# Patient Record
Sex: MALE | Race: WHITE | URBAN - METROPOLITAN AREA
[De-identification: names, ages, dates, MRNs, and addresses within clinical notes are randomized per-mention and may not be internally consistent; named-entity substitution may affect disease eponyms.]

---

## 2024-04-19 RX ORDER — AMLODIPINE AND OLMESARTAN MEDOXOMIL 5; 40 MG/1; MG/1
1 TABLET ORAL DAILY
Qty: 90 TABLET | Refills: 0 | OUTPATIENT
Start: 2024-04-19

## 2024-10-11 DIAGNOSIS — I10 HYPERTENSION, UNSPECIFIED TYPE: Primary | ICD-10-CM

## 2024-10-11 RX ORDER — AMLODIPINE AND OLMESARTAN MEDOXOMIL 5; 40 MG/1; MG/1
1 TABLET ORAL DAILY
Qty: 90 TABLET | Refills: 0 | Status: SHIPPED | OUTPATIENT
Start: 2024-10-11 | End: 2025-01-09

## 2024-10-11 RX ORDER — NEBIVOLOL 5 MG/1
5 TABLET ORAL DAILY
COMMUNITY
Start: 2023-11-11 | End: 2024-10-11 | Stop reason: SDUPTHER

## 2024-10-11 RX ORDER — AMLODIPINE AND OLMESARTAN MEDOXOMIL 5; 40 MG/1; MG/1
1 TABLET ORAL DAILY
COMMUNITY
Start: 2024-01-11 | End: 2024-10-11 | Stop reason: SDUPTHER

## 2024-10-11 RX ORDER — NEBIVOLOL 5 MG/1
5 TABLET ORAL DAILY
Qty: 90 TABLET | Refills: 0 | Status: SHIPPED | OUTPATIENT
Start: 2024-10-11 | End: 2025-01-09

## 2024-12-12 ENCOUNTER — APPOINTMENT (OUTPATIENT)
Dept: PRIMARY CARE | Facility: CLINIC | Age: 74
End: 2024-12-12

## 2024-12-12 VITALS
HEART RATE: 66 BPM | BODY MASS INDEX: 29.12 KG/M2 | WEIGHT: 215 LBS | HEIGHT: 72 IN | DIASTOLIC BLOOD PRESSURE: 90 MMHG | SYSTOLIC BLOOD PRESSURE: 140 MMHG | OXYGEN SATURATION: 94 %

## 2024-12-12 DIAGNOSIS — Z00.00 MEDICARE ANNUAL WELLNESS VISIT, SUBSEQUENT: Primary | ICD-10-CM

## 2024-12-12 DIAGNOSIS — R06.09 DYSPNEA ON EXERTION: ICD-10-CM

## 2024-12-12 DIAGNOSIS — I49.9 CARDIAC ARRHYTHMIA, UNSPECIFIED CARDIAC ARRHYTHMIA TYPE: ICD-10-CM

## 2024-12-12 PROBLEM — I10 HYPERTENSION: Status: ACTIVE | Noted: 2024-12-12

## 2024-12-12 PROCEDURE — G0439 PPPS, SUBSEQ VISIT: HCPCS

## 2024-12-12 PROCEDURE — 3080F DIAST BP >= 90 MM HG: CPT

## 2024-12-12 PROCEDURE — 1159F MED LIST DOCD IN RCRD: CPT

## 2024-12-12 PROCEDURE — 99214 OFFICE O/P EST MOD 30 MIN: CPT

## 2024-12-12 PROCEDURE — 3077F SYST BP >= 140 MM HG: CPT

## 2024-12-12 PROCEDURE — 1170F FXNL STATUS ASSESSED: CPT

## 2024-12-12 PROCEDURE — 1036F TOBACCO NON-USER: CPT

## 2024-12-12 PROCEDURE — 3008F BODY MASS INDEX DOCD: CPT

## 2024-12-12 RX ORDER — THIAMINE HCL 250 MG
1000 TABLET ORAL DAILY
COMMUNITY

## 2024-12-12 ASSESSMENT — ACTIVITIES OF DAILY LIVING (ADL)
BATHING: INDEPENDENT
DRESSING: INDEPENDENT

## 2024-12-12 ASSESSMENT — ENCOUNTER SYMPTOMS
OCCASIONAL FEELINGS OF UNSTEADINESS: 0
LOSS OF SENSATION IN FEET: 0
DEPRESSION: 0

## 2024-12-12 ASSESSMENT — PATIENT HEALTH QUESTIONNAIRE - PHQ9
2. FEELING DOWN, DEPRESSED OR HOPELESS: NOT AT ALL
1. LITTLE INTEREST OR PLEASURE IN DOING THINGS: NOT AT ALL
SUM OF ALL RESPONSES TO PHQ9 QUESTIONS 1 AND 2: 0

## 2024-12-12 NOTE — PROGRESS NOTES
I reviewed and examined the patient. I was present for the key exam elements, and I fully participated in the patient's care. I discussed the management of the care with the resident. I have personally reviewed the pertinent labs and imaging, as well as recent notes, with the patient. I have reviewed the note above and agree with the resident's medical decision making as documented in the resident's note, in addition to the following comments / findings:     Agree with the rest of the plan outlined below by resident physician. No red flags.      The patient understands and agrees to the assessment and plan of care. Patient has also agreed to follow up and comply with the treatment and evaluation as recommended today. Patient was instructed to call the office at [phone] should questions arise regarding their treatment or care.     Alfred Robin DO, FAOASM  Family Medicine   [address], [address]  [place] [address]     Alfred Robin DO

## 2024-12-12 NOTE — PROGRESS NOTES
Subjective   Patient ID: Yevgeniy Anders is a 74 y.o. male who presents for Annual Exam.    Past Medical, Surgical, and Family History reviewed and updated in chart.    Reviewed all medications by prescribing practitioner or clinical pharmacist (such as prescriptions, OTCs, herbal therapies and supplements) and documented in the medical record.    HPI  1.  Physical exam  Colonoscopy: 8/2022  Immunizations: declines flu shot, pneumococcal, zoster, tdap due (declines)     2.  Dyspnea on Exertion  He states that for a 4 month period of time this past summer, Yevgeniy was suffering from dyspnea on exertion, orthopnea, abdominal pain and bloating, indigestion  He states that he found a YouTube video with a dietitian talking about beriberi that for the symptoms he was experiencing and began taking high-dose thiamine supplementation at the videos recommendation  He states that about 2 months after beginning thiamine supplementation, his symptoms mostly resolved and that he believes it was the cure for his symptoms  He states that the orthopnea and GI issues have resolved but that he is still suffering from occasional dyspnea with exertion.  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 HTN  Patient is on amlodipine olmesartan and nebivolol  His blood pressure in the office is 140/90 and 135/90 on repeat  Other than the occasional dyspnea on exertion, he denies ACS symptoms    Review of Systems  All pertinent positive symptoms are included in the history of present illness.    All other systems have been reviewed and are negative and noncontributory to this patient's current ailments.    History reviewed. No pertinent past medical history.  History reviewed. No pertinent surgical  history.  Social History     Tobacco Use    Smoking status: Former     Types: Cigarettes    Smokeless tobacco: Never     No family history on file.  Immunization History   Administered Date(s) Administered    Moderna SARS-CoV-2 Vaccination 10/06/2021, 11/02/2021     Current Outpatient Medications   Medication Instructions    amlodipine-olmesartan (Jerri) 5-40 mg tablet 1 tablet, oral, Daily    nebivolol (BYSTOLIC) 5 mg, oral, Daily    thiamine (VITAMIN B-1) 1,000 mg, oral, Daily     No Known Allergies    Objective   Vitals:    12/12/24 0931   BP: 140/90   Pulse: 66   SpO2: 94%   Weight: 97.5 kg (215 lb)   Height: 1.829 m (6')     Body mass index is 29.16 kg/m².    BP Readings from Last 3 Encounters:   12/12/24 140/90   12/07/21 (!) 185/101      Wt Readings from Last 3 Encounters:   12/12/24 97.5 kg (215 lb)   12/07/21 90.7 kg (200 lb)        No visits with results within 1 Month(s) from this visit.   Latest known visit with results is:   Legacy Encounter on 01/24/2022   Component Date Value    WBC 01/24/2022 9.1     RBC 01/24/2022 5.21     Hemoglobin 01/24/2022 14.8     Hematocrit 01/24/2022 46.6     MCV 01/24/2022 89.4     MCH 01/24/2022 28.4     MCHC 01/24/2022 31.8     RDW-SD 01/24/2022 46.7     RDW-CV 01/24/2022 14.5     Platelets 01/24/2022 354     MPV 01/24/2022 10.2     nRBC 01/24/2022 0     Calcium 01/24/2022 9.6     AST 01/24/2022 21     Alkaline Phosphatase 01/24/2022 91     Total Bilirubin 01/24/2022 0.3     Total Protein 01/24/2022 7.8     Albumin 01/24/2022 4.3     Globulin, Total 01/24/2022 3.5     A/G Ratio 01/24/2022 1.2 (L)     Sodium 01/24/2022 139     Potassium 01/24/2022 4.6     Chloride 01/24/2022 102     Bicarbonate 01/24/2022 22 (L)     Anion Gap 01/24/2022 15     Urea Nitrogen 01/24/2022 15     Creatinine 01/24/2022 0.7     Urea Nitrogen/Creatinine* 01/24/2022 21.4 (H)     Glucose 01/24/2022 109 (H)     ALT (SGPT) 01/24/2022 19     ESTIMATED GFR 01/24/2022 100     SERUM COLOR 01/24/2022 YELLOW      SERUM CLARITY 01/24/2022 SLIGHTLY LIPEMIC     Fasting status 01/24/2022 UNKNOWN     Cholesterol 01/24/2022 174     Triglycerides 01/24/2022 110     HDL 01/24/2022 52     LDL Calculated 01/24/2022 100     Cholesterol/HDL Ratio 01/24/2022 3.3     PSA 01/24/2022 1.0     Thyroid Stimulating Horm* 01/24/2022 2.20      Physical Exam  CONSTITUTIONAL - well nourished, well developed, looks like stated age, in no acute distress, not ill-appearing, and not tired appearing  SKIN - vitiligo on hands bilaterally, normal skin turgor without rash, lesions, or nodules visualized  HEAD - no trauma, normocephalic  EYES - pupils are equal and reactive to light, extraocular muscles are intact, and normal external exam  NECK - supple without rigidity, no neck mass was observed, no thyromegaly or thyroid nodules  CHEST - clear to auscultation, no wheezing, no crackles and no rales, good effort  CARDIAC - irregular rhythm/skipped beats, regular rate, no murmur  ABDOMEN - no organomegaly, soft, nontender, nondistended, normal bowel sounds, no guarding/rebound/rigidity, negative McBurney sign and negative Clinton sign  EXTREMITIES - no obvious or evident edema, no obvious or evident deformities  NEUROLOGICAL - normal gait, normal balance, normal motor, no ataxia, alert, oriented and no focal signs  PSYCHIATRIC - alert, pleasant and cordial, age-appropriate  IMMUNOLOGIC - no cervical lymphadenopathy    Assessment/Plan   Problem List Items Addressed This Visit    None  Visit Diagnoses       Medicare annual wellness visit, subsequent    -  Primary    Relevant Orders    Prostate Specific Antigen    TSH with reflex to Free T4 if abnormal    Lipid Panel    Comprehensive Metabolic Panel    CBC and Auto Differential    Hemoglobin A1C    XR chest 2 views    Vitamin B1, Whole Blood    Dyspnea on exertion        Relevant Orders    TSH with reflex to Free T4 if abnormal    Comprehensive Metabolic Panel    CBC and Auto Differential    XR chest 2  views    Vitamin B1, Whole Blood    B-type natriuretic peptide    Cardiac arrhythmia, unspecified cardiac arrhythmia type              #Medicare Wellness  - colonoscopy UTD  - declines routine immunizations   - routine labs     #HTN  -Patient continues to be slightly hypertensive, however we will hold on medications at this time until he is evaluated by cardiology    #Dyspnea on Exertion  #Unspecified Arrhythmia   - Although mostly resolved, reported symptoms are highly suggestive of a cardiac etiology and likely congestive heart failure.  This is further supported by a new arrhythmia identified on exam.  EKG was attempted in the office however the machine could not capture a good reading.  -Patient is a former alcoholic however fulminant beriberi is very unlikely to be the cause of patient's symptoms, especially since his symptoms did not resolve until 2 months of supplementation with thiamine.    -He is also a former smoker who quit more than 30 years ago, so he does not qualify for low-dose CT cancer screening.  With that said, we will order chest x-ray to evaluate for any intrathoracic pathology that may be contributing to his dyspnea such as effusion or malignancy.  -He is amenable to cardiology referral for further evaluation    Follow-up in 1 year for subsequent Medicare visit or earlier as needed.    Franck Page, DO  PGY-2 Family Medicine

## 2025-03-29 ENCOUNTER — HOSPITAL ENCOUNTER (EMERGENCY)
Facility: HOSPITAL | Age: 75
Discharge: HOME | End: 2025-03-29
Payer: MEDICARE

## 2025-03-29 ENCOUNTER — APPOINTMENT (OUTPATIENT)
Dept: CARDIOLOGY | Facility: HOSPITAL | Age: 75
End: 2025-03-29
Payer: MEDICARE

## 2025-03-29 ENCOUNTER — APPOINTMENT (OUTPATIENT)
Dept: RADIOLOGY | Facility: HOSPITAL | Age: 75
End: 2025-03-29
Payer: MEDICARE

## 2025-03-29 VITALS
HEART RATE: 75 BPM | HEIGHT: 73 IN | BODY MASS INDEX: 28.72 KG/M2 | WEIGHT: 216.71 LBS | TEMPERATURE: 97.9 F | SYSTOLIC BLOOD PRESSURE: 125 MMHG | DIASTOLIC BLOOD PRESSURE: 79 MMHG | RESPIRATION RATE: 16 BRPM | OXYGEN SATURATION: 93 %

## 2025-03-29 DIAGNOSIS — R06.02 SHORTNESS OF BREATH: Primary | ICD-10-CM

## 2025-03-29 LAB
ALBUMIN SERPL BCP-MCNC: 4.5 G/DL (ref 3.4–5)
ALP SERPL-CCNC: 63 U/L (ref 33–136)
ALT SERPL W P-5'-P-CCNC: 40 U/L (ref 10–52)
ANION GAP SERPL CALCULATED.3IONS-SCNC: 11 MMOL/L (ref 10–20)
AST SERPL W P-5'-P-CCNC: 23 U/L (ref 9–39)
BASOPHILS # BLD AUTO: 0.06 X10*3/UL (ref 0–0.1)
BASOPHILS NFR BLD AUTO: 0.9 %
BILIRUB SERPL-MCNC: 0.6 MG/DL (ref 0–1.2)
BNP SERPL-MCNC: 75 PG/ML (ref 0–99)
BUN SERPL-MCNC: 20 MG/DL (ref 6–23)
CALCIUM SERPL-MCNC: 9.7 MG/DL (ref 8.6–10.3)
CARDIAC TROPONIN I PNL SERPL HS: 4 NG/L (ref 0–20)
CARDIAC TROPONIN I PNL SERPL HS: 4 NG/L (ref 0–20)
CHLORIDE SERPL-SCNC: 105 MMOL/L (ref 98–107)
CO2 SERPL-SCNC: 26 MMOL/L (ref 21–32)
CREAT SERPL-MCNC: 0.97 MG/DL (ref 0.5–1.3)
EGFRCR SERPLBLD CKD-EPI 2021: 83 ML/MIN/1.73M*2
EOSINOPHIL # BLD AUTO: 0.19 X10*3/UL (ref 0–0.4)
EOSINOPHIL NFR BLD AUTO: 2.9 %
ERYTHROCYTE [DISTWIDTH] IN BLOOD BY AUTOMATED COUNT: 13.2 % (ref 11.5–14.5)
GLUCOSE SERPL-MCNC: 115 MG/DL (ref 74–99)
HCT VFR BLD AUTO: 48.6 % (ref 41–52)
HGB BLD-MCNC: 15.9 G/DL (ref 13.5–17.5)
IMM GRANULOCYTES # BLD AUTO: 0.02 X10*3/UL (ref 0–0.5)
IMM GRANULOCYTES NFR BLD AUTO: 0.3 % (ref 0–0.9)
LYMPHOCYTES # BLD AUTO: 1.53 X10*3/UL (ref 0.8–3)
LYMPHOCYTES NFR BLD AUTO: 23.5 %
MCH RBC QN AUTO: 29.8 PG (ref 26–34)
MCHC RBC AUTO-ENTMCNC: 32.7 G/DL (ref 32–36)
MCV RBC AUTO: 91 FL (ref 80–100)
MONOCYTES # BLD AUTO: 0.43 X10*3/UL (ref 0.05–0.8)
MONOCYTES NFR BLD AUTO: 6.6 %
NEUTROPHILS # BLD AUTO: 4.28 X10*3/UL (ref 1.6–5.5)
NEUTROPHILS NFR BLD AUTO: 65.8 %
NRBC BLD-RTO: 0 /100 WBCS (ref 0–0)
PLATELET # BLD AUTO: 240 X10*3/UL (ref 150–450)
POTASSIUM SERPL-SCNC: 4.3 MMOL/L (ref 3.5–5.3)
PROT SERPL-MCNC: 7.4 G/DL (ref 6.4–8.2)
RBC # BLD AUTO: 5.33 X10*6/UL (ref 4.5–5.9)
SODIUM SERPL-SCNC: 138 MMOL/L (ref 136–145)
WBC # BLD AUTO: 6.5 X10*3/UL (ref 4.4–11.3)

## 2025-03-29 PROCEDURE — 71045 X-RAY EXAM CHEST 1 VIEW: CPT

## 2025-03-29 PROCEDURE — 93005 ELECTROCARDIOGRAM TRACING: CPT

## 2025-03-29 PROCEDURE — 2550000001 HC RX 255 CONTRASTS

## 2025-03-29 PROCEDURE — 2500000004 HC RX 250 GENERAL PHARMACY W/ HCPCS (ALT 636 FOR OP/ED): Mod: JZ

## 2025-03-29 PROCEDURE — 80053 COMPREHEN METABOLIC PANEL: CPT | Performed by: EMERGENCY MEDICINE

## 2025-03-29 PROCEDURE — 85025 COMPLETE CBC W/AUTO DIFF WBC: CPT | Performed by: EMERGENCY MEDICINE

## 2025-03-29 PROCEDURE — 84484 ASSAY OF TROPONIN QUANT: CPT | Performed by: EMERGENCY MEDICINE

## 2025-03-29 PROCEDURE — 71045 X-RAY EXAM CHEST 1 VIEW: CPT | Performed by: RADIOLOGY

## 2025-03-29 PROCEDURE — 83880 ASSAY OF NATRIURETIC PEPTIDE: CPT | Performed by: EMERGENCY MEDICINE

## 2025-03-29 PROCEDURE — 99285 EMERGENCY DEPT VISIT HI MDM: CPT | Mod: 25

## 2025-03-29 PROCEDURE — 71275 CT ANGIOGRAPHY CHEST: CPT | Performed by: RADIOLOGY

## 2025-03-29 PROCEDURE — 94640 AIRWAY INHALATION TREATMENT: CPT

## 2025-03-29 PROCEDURE — 71275 CT ANGIOGRAPHY CHEST: CPT

## 2025-03-29 PROCEDURE — 96374 THER/PROPH/DIAG INJ IV PUSH: CPT | Mod: 59

## 2025-03-29 PROCEDURE — 2500000002 HC RX 250 W HCPCS SELF ADMINISTERED DRUGS (ALT 637 FOR MEDICARE OP, ALT 636 FOR OP/ED): Mod: MUE

## 2025-03-29 PROCEDURE — 36415 COLL VENOUS BLD VENIPUNCTURE: CPT | Performed by: EMERGENCY MEDICINE

## 2025-03-29 RX ORDER — METHYLPREDNISOLONE 4 MG/1
TABLET ORAL
Qty: 21 TABLET | Refills: 0 | Status: SHIPPED | OUTPATIENT
Start: 2025-03-29 | End: 2025-04-04

## 2025-03-29 RX ORDER — IPRATROPIUM BROMIDE AND ALBUTEROL SULFATE 2.5; .5 MG/3ML; MG/3ML
3 SOLUTION RESPIRATORY (INHALATION)
Status: DISCONTINUED | OUTPATIENT
Start: 2025-03-29 | End: 2025-03-29 | Stop reason: HOSPADM

## 2025-03-29 RX ORDER — ALBUTEROL SULFATE 90 UG/1
2 INHALANT RESPIRATORY (INHALATION) EVERY 4 HOURS PRN
Qty: 18 G | Refills: 0 | Status: SHIPPED | OUTPATIENT
Start: 2025-03-29 | End: 2025-04-28

## 2025-03-29 RX ADMIN — IOHEXOL 75 ML: 350 INJECTION, SOLUTION INTRAVENOUS at 14:11

## 2025-03-29 RX ADMIN — IPRATROPIUM BROMIDE AND ALBUTEROL SULFATE 3 ML: 2.5; .5 SOLUTION RESPIRATORY (INHALATION) at 12:31

## 2025-03-29 RX ADMIN — IPRATROPIUM BROMIDE AND ALBUTEROL SULFATE 3 ML: 2.5; .5 SOLUTION RESPIRATORY (INHALATION) at 12:30

## 2025-03-29 RX ADMIN — METHYLPREDNISOLONE SODIUM SUCCINATE 125 MG: 125 INJECTION, POWDER, FOR SOLUTION INTRAMUSCULAR; INTRAVENOUS at 12:46

## 2025-03-29 ASSESSMENT — PAIN DESCRIPTION - ORIENTATION: ORIENTATION: LEFT;RIGHT;UPPER

## 2025-03-29 ASSESSMENT — PAIN DESCRIPTION - PAIN TYPE: TYPE: ACUTE PAIN

## 2025-03-29 ASSESSMENT — COLUMBIA-SUICIDE SEVERITY RATING SCALE - C-SSRS
2. HAVE YOU ACTUALLY HAD ANY THOUGHTS OF KILLING YOURSELF?: NO
6. HAVE YOU EVER DONE ANYTHING, STARTED TO DO ANYTHING, OR PREPARED TO DO ANYTHING TO END YOUR LIFE?: NO
1. IN THE PAST MONTH, HAVE YOU WISHED YOU WERE DEAD OR WISHED YOU COULD GO TO SLEEP AND NOT WAKE UP?: NO

## 2025-03-29 ASSESSMENT — PAIN DESCRIPTION - LOCATION: LOCATION: CHEST

## 2025-03-29 ASSESSMENT — PAIN DESCRIPTION - FREQUENCY: FREQUENCY: CONSTANT/CONTINUOUS

## 2025-03-29 ASSESSMENT — PAIN SCALES - GENERAL: PAINLEVEL_OUTOF10: 5 - MODERATE PAIN

## 2025-03-29 ASSESSMENT — PAIN - FUNCTIONAL ASSESSMENT: PAIN_FUNCTIONAL_ASSESSMENT: 0-10

## 2025-03-29 ASSESSMENT — PAIN DESCRIPTION - DESCRIPTORS: DESCRIPTORS: TIGHTNESS

## 2025-03-29 ASSESSMENT — PAIN DESCRIPTION - PROGRESSION: CLINICAL_PROGRESSION: NOT CHANGED

## 2025-03-29 ASSESSMENT — PAIN DESCRIPTION - ONSET: ONSET: SUDDEN

## 2025-03-31 LAB
ATRIAL RATE: 95 BPM
P AXIS: 69 DEGREES
P OFFSET: 199 MS
P ONSET: 139 MS
PR INTERVAL: 174 MS
Q ONSET: 226 MS
QRS COUNT: 15 BEATS
QRS DURATION: 94 MS
QT INTERVAL: 366 MS
QTC CALCULATION(BAZETT): 459 MS
QTC FREDERICIA: 426 MS
R AXIS: 83 DEGREES
T AXIS: 67 DEGREES
T OFFSET: 409 MS
VENTRICULAR RATE: 95 BPM

## 2025-04-02 NOTE — ED PROVIDER NOTES
HPI   Chief Complaint   Patient presents with    Shortness of Breath     I have been sob and it is hard for me to take a deep breath because I have tightness in my upper chest        HPI  Patient is a 74-year-old male who presents ED for shortness of breath x 2 years.  Patient reports progressively worsening exertional breath over the last couple of years, states when he walks relatively short distances he is very short of breath.  Denies any chest pain.  Endorses smoking for 30 years but quit 20 years ago.  Denies any formal diagnosis of COPD, does not wear oxygen.  Denies any history of DVT or PE, does not take blood thinners.  Denies any history of MI or CAD.  Denies any fever or chills.  Denies any cough.      Patient History   History reviewed. No pertinent past medical history.  History reviewed. No pertinent surgical history.  No family history on file.  Social History     Tobacco Use    Smoking status: Former     Types: Cigarettes    Smokeless tobacco: Never   Substance Use Topics    Alcohol use: Not on file    Drug use: Not on file       Physical Exam   ED Triage Vitals [03/29/25 1157]   Temperature Heart Rate Respirations BP   36.6 °C (97.9 °F) 68 18 (!) 157/99      Pulse Ox Temp Source Heart Rate Source Patient Position   97 % Oral Monitor Sitting      BP Location FiO2 (%)     Left arm --       Physical Exam  Vitals reviewed.   Constitutional:       General: He is not in acute distress.     Appearance: Normal appearance. He is not ill-appearing.   HENT:      Head: Normocephalic and atraumatic.   Eyes:      Extraocular Movements: Extraocular movements intact.   Cardiovascular:      Rate and Rhythm: Normal rate and regular rhythm.      Heart sounds: Normal heart sounds.   Pulmonary:      Effort: Pulmonary effort is normal.      Breath sounds: Normal breath sounds.   Abdominal:      General: Abdomen is flat. There is no distension.      Palpations: Abdomen is soft.      Tenderness: There is no abdominal  tenderness.   Musculoskeletal:         General: Normal range of motion.      Cervical back: Normal range of motion and neck supple.   Skin:     General: Skin is warm and dry.   Neurological:      General: No focal deficit present.      Mental Status: He is alert and oriented to person, place, and time.   Psychiatric:         Mood and Affect: Mood normal.         Behavior: Behavior normal.           ED Course & MDM   ED Course as of 04/02/25 1712   Sat Mar 29, 2025   1151 EKG on my independent interpretation: Normal sinus rhythm 95 bpm, normal axis, normal intervals, nonspecific ST/T wave inability although difficult to interpret in setting of significant artifact, no prior EKG for comparison [AAYUSH]   1337 Patient states he feels better after breathing treatments and steroids although his oxygen saturation is now 89% on room air.  Unclear why patient's oxygen saturation would drop below 90% after breathing treatment.  I made adjustments with the sensor to ensure normal waveform, patient is satting around 89 to 90% with normal waveform.  CT angio chest ordered to evaluate for possible pulmonary embolism. [VINCENT]      ED Course User Index  [VINCENT] London Durbin PA-C  [AAYUSH] Caitlin Roa MD         Diagnoses as of 04/02/25 1712   Shortness of breath                 No data recorded     [place] Coma Scale Score: 15 (03/29/25 1155 : Ilya Sheldon, EMT)                           Medical Decision Making  Parts of this chart have been completed using voice recognition software. Please excuse any errors of transcription.  My thought process and reason for plan has been formulated from the time that I saw the patient until the time of disposition and is not specific to one specific moment during their visit and furthermore my MDM encompasses this entire chart and not only this text box.    HPI:   A medically appropriate HPI was obtained, outlined above.    Yevgeniy Anders is a  74 y.o. male    Chief Complaint   Patient presents  with    Shortness of Breath     I have been sob and it is hard for me to take a deep breath because I have tightness in my upper chest        History reviewed. No pertinent past medical history.    History reviewed. No pertinent surgical history.    Social History     Tobacco Use    Smoking status: Former     Types: Cigarettes    Smokeless tobacco: Never       No family history on file.    No Known Allergies    Current Outpatient Medications   Medication Instructions    albuterol 90 mcg/actuation inhaler 2 puffs, inhalation, Every 4 hours PRN    amlodipine-olmesartan (Jerri) 5-40 mg tablet 1 tablet, oral, Daily    methylPREDNISolone (Medrol Dospak) 4 mg tablets Follow schedule on package instructions    nebivolol (BYSTOLIC) 5 mg, oral, Daily    thiamine (VITAMIN B-1) 1,000 mg, oral, Daily   for details    Exam:   No data found.    A medically appropriate exam performed, outlined above, given the known history and presentation.    EKG/Cardiac monitor:   If EKG was done and, it was interpreted by attending physician, see their note for ED course for more detail.    Medications given during visit:  Medications   methylPREDNISolone sod succinate (SOLU-Medrol) injection 125 mg (125 mg intravenous Given 3/29/25 1246)   iohexol (OMNIPaque) 350 mg iodine/mL solution 75 mL (75 mL intravenous Given 3/29/25 1411)        Diagnostic/tests:  Labs Reviewed   COMPREHENSIVE METABOLIC PANEL - Abnormal       Result Value    Glucose 115 (*)     Sodium 138      Potassium 4.3      Chloride 105      Bicarbonate 26      Anion Gap 11      Urea Nitrogen 20      Creatinine 0.97      eGFR 83      Calcium 9.7      Albumin 4.5      Alkaline Phosphatase 63      Total Protein 7.4      AST 23      Bilirubin, Total 0.6      ALT 40     B-TYPE NATRIURETIC PEPTIDE - Normal    BNP 75      Narrative:        <100 pg/mL - Heart failure unlikely  100-299 pg/mL - Intermediate probability of acute heart                  failure exacerbation. Correlate with  clinical                  context and patient history.    >=300 pg/mL - Heart Failure likely. Correlate with clinical                  context and patient history.    BNP testing is performed using different testing methodology at [facility] than at other [facility]. Direct result comparisons should only be made within the same method.      SERIAL TROPONIN-INITIAL - Normal    Troponin I, High Sensitivity 4      Narrative:     Less than 99th percentile of normal range cutoff-  Female and children under 18 years old <14 ng/L; Male <21 ng/L: Negative  Repeat testing should be performed if clinically indicated.     Female and children under 18 years old 14-50 ng/L; Male 21-50 ng/L:  Consistent with possible cardiac damage and possible increased clinical   risk. Serial measurements may help to assess extent of myocardial damage.     >50 ng/L: Consistent with cardiac damage, increased clinical risk and  myocardial infarction. Serial measurements may help assess extent of   myocardial damage.      NOTE: Children less than 1 year old may have higher baseline troponin   levels and results should be interpreted in conjunction with the overall   clinical context.     NOTE: Troponin I testing is performed using a different   testing methodology at [facility] than at other   [facility]. Direct result comparisons should only   be made within the same method.   SERIAL TROPONIN, 1 HOUR - Normal    Troponin I, High Sensitivity 4      Narrative:     Less than 99th percentile of normal range cutoff-  Female and children under 18 years old <14 ng/L; Male <21 ng/L: Negative  Repeat testing should be performed if clinically indicated.     Female and children under 18 years old 14-50 ng/L; Male 21-50 ng/L:  Consistent with possible cardiac damage and possible increased clinical   risk. Serial measurements may help to assess extent of myocardial damage.     >50 ng/L: Consistent with cardiac damage,  increased clinical risk and  myocardial infarction. Serial measurements may help assess extent of   myocardial damage.      NOTE: Children less than 1 year old may have higher baseline troponin   levels and results should be interpreted in conjunction with the overall   clinical context.     NOTE: Troponin I testing is performed using a different   testing methodology at [facility] than at other   [facility]. Direct result comparisons should only   be made within the same method.   CBC WITH AUTO DIFFERENTIAL    WBC 6.5      nRBC 0.0      RBC 5.33      Hemoglobin 15.9      Hematocrit 48.6      MCV 91      MCH 29.8      MCHC 32.7      RDW 13.2      Platelets 240      Neutrophils % 65.8      Immature Granulocytes %, Automated 0.3      Lymphocytes % 23.5      Monocytes % 6.6      Eosinophils % 2.9      Basophils % 0.9      Neutrophils Absolute 4.28      Immature Granulocytes Absolute, Automated 0.02      Lymphocytes Absolute 1.53      Monocytes Absolute 0.43      Eosinophils Absolute 0.19      Basophils Absolute 0.06     TROPONIN SERIES- (INITIAL, 1 HR)    Narrative:     The following orders were created for panel order Troponin I Series, High Sensitivity (0, 1 HR).  Procedure                               Abnormality         Status                     ---------                               -----------         ------                     Troponin I, High Sensiti...[[id number]]  Normal              Final result               Troponin, High Sensitivi...[[id number]]  Normal              Final result                 Please view results for these tests on the individual orders.        CT angio chest for pulmonary embolism   Final Result   1. No evidence of acute pulmonary embolus.   2. No focal infiltrate, pulmonary nodule or lymphadenopathy   identified.        MACRO:   None.             Signed by: Jarek Rodriguez 3/29/2025 2:52 PM   Dictation workstation:   [id number]      XR chest 1 view   Final Result    No acute cardiopulmonary abnormality.        Signed by: Ch[place]ga Cherrymyesha 3/29/2025 12:44 PM   Dictation workstation:   [id number]             [facility] Summary:  Unremarkable workup today.  Suspicion for ACS, CT negative for PE.  Return precautions were discussed.  Advised patient to follow-up with primary care provider in 1 to 2 weeks.    We have discussed the diagnosis and risks, and we agree with discharging home to follow-up with appropriate physician as directed. We also discussed returning to the Emergency Department immediately if new or worsening symptoms occur. We have discussed the symptoms which are most concerning that necessitate immediate return. Pt symptoms have been well controlled here and the patient is safe for discharge with appropriate outpatient follow up. The patient has verbalized understanding to return to ER without delay for new or worsening pains or for any other symptoms or concerns. I utilized the discharge clinical management tool provided [employer] to help estimate risk of negative outcome for this patient.      Disposition:  ED Prescriptions       Medication Sig Dispense Start Date End Date Auth. Provider    albuterol 90 mcg/actuation inhaler Inhale 2 puffs every 4 hours if needed for wheezing. 18 g 3/29/2025 4/28/2025 London Durbin PA-C    methylPREDNISolone (Medrol Dospak) 4 mg tablets Follow schedule on package instructions 21 tablet 3/29/2025 4/4/2025 London Durbin PA-C              Procedure  Procedures     London Durbin PA-C  04/02/25 5856

## 2025-04-21 DIAGNOSIS — I10 HYPERTENSION, UNSPECIFIED TYPE: ICD-10-CM

## 2025-04-21 NOTE — TELEPHONE ENCOUNTER
[facility] needs refill on   Amlodipine -olmesartan 5-40 mg   Nebivolol 5 mg   [facility] [place]

## 2025-04-23 ENCOUNTER — APPOINTMENT (OUTPATIENT)
Dept: PULMONOLOGY | Facility: CLINIC | Age: 75
End: 2025-04-23
Payer: MEDICARE

## 2025-04-23 VITALS
SYSTOLIC BLOOD PRESSURE: 175 MMHG | BODY MASS INDEX: 28.47 KG/M2 | OXYGEN SATURATION: 92 % | HEART RATE: 108 BPM | WEIGHT: 215.8 LBS | DIASTOLIC BLOOD PRESSURE: 110 MMHG

## 2025-04-23 DIAGNOSIS — R06.02 SHORTNESS OF BREATH: ICD-10-CM

## 2025-04-23 PROCEDURE — 3080F DIAST BP >= 90 MM HG: CPT | Performed by: PEDIATRICS

## 2025-04-23 PROCEDURE — 1160F RVW MEDS BY RX/DR IN RCRD: CPT | Performed by: PEDIATRICS

## 2025-04-23 PROCEDURE — 1159F MED LIST DOCD IN RCRD: CPT | Performed by: PEDIATRICS

## 2025-04-23 PROCEDURE — 1036F TOBACCO NON-USER: CPT | Performed by: PEDIATRICS

## 2025-04-23 PROCEDURE — 3077F SYST BP >= 140 MM HG: CPT | Performed by: PEDIATRICS

## 2025-04-23 PROCEDURE — 99205 OFFICE O/P NEW HI 60 MIN: CPT | Performed by: PEDIATRICS

## 2025-04-23 RX ORDER — ALBUTEROL SULFATE 0.83 MG/ML
3 SOLUTION RESPIRATORY (INHALATION) ONCE
OUTPATIENT
Start: 2025-04-23 | End: 2025-04-23

## 2025-04-23 RX ORDER — ALBUTEROL SULFATE 90 UG/1
1 INHALANT RESPIRATORY (INHALATION) ONCE
OUTPATIENT
Start: 2025-04-23

## 2025-04-23 RX ORDER — AMLODIPINE BESYLATE AND OLMESARTAN MEDOXOMIL 5; 40 MG/1; MG/1
1 TABLET ORAL DAILY
Qty: 30 TABLET | Refills: 0 | Status: SHIPPED | OUTPATIENT
Start: 2025-04-23 | End: 2025-05-23

## 2025-04-23 RX ORDER — NEBIVOLOL 5 MG/1
5 TABLET ORAL DAILY
Qty: 30 TABLET | Refills: 0 | Status: SHIPPED | OUTPATIENT
Start: 2025-04-23 | End: 2025-05-23

## 2025-04-23 RX ORDER — ALBUTEROL SULFATE 90 UG/1
2 INHALANT RESPIRATORY (INHALATION) EVERY 4 HOURS PRN
Qty: 18 G | Refills: 11 | Status: SHIPPED | OUTPATIENT
Start: 2025-04-23 | End: 2025-05-23

## 2025-04-23 ASSESSMENT — PATIENT HEALTH QUESTIONNAIRE - PHQ9
SUM OF ALL RESPONSES TO PHQ9 QUESTIONS 1 AND 2: 0
1. LITTLE INTEREST OR PLEASURE IN DOING THINGS: NOT AT ALL
2. FEELING DOWN, DEPRESSED OR HOPELESS: NOT AT ALL

## 2025-04-23 NOTE — LETTER
"April 23, 2025     London Durbin PA-C  [address] [facility]  Emergency Medicine Dept  [facility] [address]    Patient: Poncho Anders   YOB: 1950   Date of Visit: 4/23/2025       Dear Dr. London Durbin PA-C:    Thank you for referring Poncho Anders to me for evaluation. Below are my notes for this consultation.  If you have questions, please do not hesitate to call me. I look forward to following your patient along with you.       Sincerely,     Ahsan Ross MD      CC: Alfred Robin, DO  ______________________________________________________________________________________    Subjective  Patient ID: Yevgeniy Anders \"Mariela" is a 74 y.o. male who presents for SOB/STEVENS    HPI    Mr Anders is a 74yr old that has shortness of breath over the last 2 years, it has been about the same over that time frame.  It seems somewhat random in that is does not always happen.  It does not happen at rest but at times does just from talking. He was given albuterol which helped but he is out of that.  Previously he has had no pulmonary issues.  He saw his PCP in December who suggested cardiac evaluation, this did not happen.    I reviewed an angio CT from 3/29/25 that shows moderate emphysema    He is a former smoker from age [age]    Review of Systems    See scanned documents attached to this note for review of systems, and appropriate scales/scores for this visit.    Objective  Physical Exam  Constitutional:       Appearance: Normal appearance.   HENT:      Head: Normocephalic and atraumatic.      Mouth/Throat:      Pharynx: Oropharynx is clear.   Cardiovascular:      Rate and Rhythm: Normal rate and regular rhythm.      Pulses: Normal pulses.      Heart sounds: Normal heart sounds.   Pulmonary:      Effort: Pulmonary effort is normal.      Breath sounds: Normal breath sounds. No wheezing, rhonchi or rales.   Abdominal:      General: Bowel sounds are normal.      Palpations: Abdomen is soft.   Musculoskeletal:         " General: Normal range of motion.   Skin:     General: Skin is warm and dry.   Neurological:      General: No focal deficit present.      Mental Status: He is alert and oriented to person, place, and time.   Psychiatric:         Mood and Affect: Mood normal.       Assessment/Plan    74yr old with shortness of breath     SOB: emphysema on CT chest  - will get PFT and 6MWT  - renewed albuterol  - encouraged cardiac evaluation as well.  Gave contact information for Dr Adame's office    Follow up after tests         Ahsan Ross MD 04/23/25 9:37 AM

## 2025-04-23 NOTE — PROGRESS NOTES
"Subjective   Patient ID: Yevgeniy Anders \"Poncho\" is a 74 y.o. male who presents for SOB/STEVENS    HPI    Mr Anders is a 74yr old that has shortness of breath over the last 2 years, it has been about the same over that time frame.  It seems somewhat random in that is does not always happen.  It does not happen at rest but at times does just from talking. He was given albuterol which helped but he is out of that.  Previously he has had no pulmonary issues.  He saw his PCP in December who suggested cardiac evaluation, this did not happen.    I reviewed an angio CT from 3/29/25 that shows moderate emphysema    He is a former smoker from age [age]    Review of Systems    See scanned documents attached to this note for review of systems, and appropriate scales/scores for this visit.    Objective   Physical Exam  Constitutional:       Appearance: Normal appearance.   HENT:      Head: Normocephalic and atraumatic.      Mouth/Throat:      Pharynx: Oropharynx is clear.   Cardiovascular:      Rate and Rhythm: Normal rate and regular rhythm.      Pulses: Normal pulses.      Heart sounds: Normal heart sounds.   Pulmonary:      Effort: Pulmonary effort is normal.      Breath sounds: Normal breath sounds. No wheezing, rhonchi or rales.   Abdominal:      General: Bowel sounds are normal.      Palpations: Abdomen is soft.   Musculoskeletal:         General: Normal range of motion.   Skin:     General: Skin is warm and dry.   Neurological:      General: No focal deficit present.      Mental Status: He is alert and oriented to person, place, and time.   Psychiatric:         Mood and Affect: Mood normal.       Assessment/Plan     74yr old with shortness of breath     SOB: emphysema on CT chest  - will get PFT and 6MWT  - renewed albuterol  - encouraged cardiac evaluation as well.  Gave contact information for Dr Adame's office    Follow up after tests         Ahsan Ross MD 04/23/25 9:37 AM   "

## 2025-04-30 ENCOUNTER — HOSPITAL ENCOUNTER (OUTPATIENT)
Dept: RESPIRATORY THERAPY | Facility: HOSPITAL | Age: 75
Discharge: HOME | End: 2025-04-30
Payer: MEDICARE

## 2025-04-30 DIAGNOSIS — R06.02 SHORTNESS OF BREATH: ICD-10-CM

## 2025-04-30 PROCEDURE — 94664 DEMO&/EVAL PT USE INHALER: CPT | Mod: 59

## 2025-04-30 PROCEDURE — 94640 AIRWAY INHALATION TREATMENT: CPT

## 2025-04-30 PROCEDURE — 94618 PULMONARY STRESS TESTING: CPT

## 2025-04-30 PROCEDURE — 94060 EVALUATION OF WHEEZING: CPT

## 2025-04-30 PROCEDURE — 94618 PULMONARY STRESS TESTING: CPT | Performed by: PEDIATRICS

## 2025-04-30 PROCEDURE — 94060 EVALUATION OF WHEEZING: CPT | Performed by: PEDIATRICS

## 2025-04-30 PROCEDURE — 94729 DIFFUSING CAPACITY: CPT

## 2025-04-30 PROCEDURE — 2500000002 HC RX 250 W HCPCS SELF ADMINISTERED DRUGS (ALT 637 FOR MEDICARE OP, ALT 636 FOR OP/ED): Performed by: PEDIATRICS

## 2025-04-30 PROCEDURE — 94727 GAS DIL/WSHOT DETER LNG VOL: CPT | Performed by: PEDIATRICS

## 2025-04-30 PROCEDURE — 94729 DIFFUSING CAPACITY: CPT | Performed by: PEDIATRICS

## 2025-04-30 PROCEDURE — 94727 GAS DIL/WSHOT DETER LNG VOL: CPT

## 2025-04-30 RX ORDER — ALBUTEROL SULFATE 0.83 MG/ML
3 SOLUTION RESPIRATORY (INHALATION) ONCE
Status: COMPLETED | OUTPATIENT
Start: 2025-04-30 | End: 2025-04-30

## 2025-04-30 RX ORDER — ALBUTEROL SULFATE 90 UG/1
1 INHALANT RESPIRATORY (INHALATION) ONCE
Status: COMPLETED | OUTPATIENT
Start: 2025-04-30 | End: 2025-04-30

## 2025-04-30 RX ADMIN — ALBUTEROL SULFATE 3 ML: 2.5 SOLUTION RESPIRATORY (INHALATION) at 09:31

## 2025-05-02 LAB
MGC ASCENT PFT - FEV1 - POST: 1.54
MGC ASCENT PFT - FEV1 - PRE: 1.3
MGC ASCENT PFT - FEV1 - PREDICTED: 3.31
MGC ASCENT PFT - FVC - POST: 4.03
MGC ASCENT PFT - FVC - PRE: 3.22
MGC ASCENT PFT - FVC - PREDICTED: 4.47

## 2025-05-09 ENCOUNTER — APPOINTMENT (OUTPATIENT)
Facility: CLINIC | Age: 75
End: 2025-05-09
Payer: MEDICARE

## 2025-05-13 ENCOUNTER — APPOINTMENT (OUTPATIENT)
Facility: CLINIC | Age: 75
End: 2025-05-13
Payer: MEDICARE

## 2025-05-22 ENCOUNTER — APPOINTMENT (OUTPATIENT)
Facility: CLINIC | Age: 75
End: 2025-05-22
Payer: MEDICARE

## 2025-05-22 VITALS
WEIGHT: 215 LBS | SYSTOLIC BLOOD PRESSURE: 145 MMHG | OXYGEN SATURATION: 94 % | BODY MASS INDEX: 28.49 KG/M2 | DIASTOLIC BLOOD PRESSURE: 85 MMHG | HEART RATE: 96 BPM | HEIGHT: 73 IN

## 2025-05-22 DIAGNOSIS — J44.9 CHRONIC OBSTRUCTIVE PULMONARY DISEASE, UNSPECIFIED COPD TYPE (MULTI): Primary | ICD-10-CM

## 2025-05-22 DIAGNOSIS — I10 PRIMARY HYPERTENSION: ICD-10-CM

## 2025-05-22 PROCEDURE — 3077F SYST BP >= 140 MM HG: CPT

## 2025-05-22 PROCEDURE — 3008F BODY MASS INDEX DOCD: CPT

## 2025-05-22 PROCEDURE — 3079F DIAST BP 80-89 MM HG: CPT

## 2025-05-22 PROCEDURE — 99214 OFFICE O/P EST MOD 30 MIN: CPT

## 2025-05-22 PROCEDURE — 1159F MED LIST DOCD IN RCRD: CPT

## 2025-05-22 RX ORDER — AMLODIPINE BESYLATE AND OLMESARTAN MEDOXOMIL 10; 40 MG/1; MG/1
1 TABLET ORAL DAILY
Qty: 30 TABLET | Refills: 1 | Status: SHIPPED | OUTPATIENT
Start: 2025-05-22 | End: 2025-07-21

## 2025-05-22 RX ORDER — LOSARTAN POTASSIUM 25 MG/1
TABLET ORAL DAILY
COMMUNITY
End: 2025-05-22 | Stop reason: WASHOUT

## 2025-05-22 RX ORDER — AMLODIPINE BESYLATE 10 MG/1
TABLET ORAL DAILY
COMMUNITY
End: 2025-05-22 | Stop reason: WASHOUT

## 2025-05-22 RX ORDER — FLUTICASONE FUROATE, UMECLIDINIUM BROMIDE AND VILANTEROL TRIFENATATE 100; 62.5; 25 UG/1; UG/1; UG/1
1 POWDER RESPIRATORY (INHALATION) DAILY
Qty: 28 EACH | Refills: 3 | Status: SHIPPED | OUTPATIENT
Start: 2025-05-22

## 2025-05-22 ASSESSMENT — PATIENT HEALTH QUESTIONNAIRE - PHQ9
1. LITTLE INTEREST OR PLEASURE IN DOING THINGS: NOT AT ALL
SUM OF ALL RESPONSES TO PHQ9 QUESTIONS 1 AND 2: 0
2. FEELING DOWN, DEPRESSED OR HOPELESS: NOT AT ALL

## 2025-05-22 NOTE — PROGRESS NOTES
"Chief Complaint  Patient ID: Yevgeniy Anders \"Poncho\" is a 74 y.o. male who presents for Follow-up.    History of Present Illness    Dyspnea/SOB  Pt presents for follow up of dyspnea. He states that he becomes SOB and dyspneic with exertion that has been going on for almost one year. He finds himself using an albuterol inhaler several times daily with moderate relief. He is a former smoker. He recently had a PFT ordered by pulmonology that has not been reviewed with him yet. He is inquiring about the results and management. There is also a TTE that has yet to be scheduled however pt denies orthopnea, leg swelling.     HTN  Pt is on amlodipine-olmesartan 5-40mg which he tolerates well  He does not measure BP at home  He denies CP, HA, vision abnormalities    Review of Systems  All pertinent positive symptoms are included in the history of present illness.    All other systems have been reviewed and are negative and noncontributory to this patient's current ailments.    Past Medical History  He has no past medical history on file.    Surgical History  He has no past surgical history on file.     Social History  He reports that he has quit smoking. His smoking use included cigarettes. He has never used smokeless tobacco. He reports that he does not currently use alcohol. He reports current drug use.    Family History[1]  Medications Prior to Visit[2]    Allergies  Patient has no known allergies.    Immunization History   Administered Date(s) Administered    Moderna SARS-CoV-2 Vaccination 10/06/2021, 11/02/2021     Objective   Visit Vitals  /85   Pulse 96   Ht 1.854 m (6' 1\")   Wt 97.5 kg (215 lb)   SpO2 94%   BMI 28.37 kg/m²   Smoking Status Former   BSA 2.24 m²        BP Readings from Last 3 Encounters:   05/22/25 145/85   04/23/25 (!) 175/110   03/29/25 125/79      Wt Readings from Last 3 Encounters:   05/22/25 97.5 kg (215 lb)   04/23/25 97.9 kg (215 lb 12.8 oz)   03/29/25 98.3 kg (216 lb 11.4 oz)      Relevant " Results  Hospital Outpatient Visit on 04/30/2025   Component Date Value    FVC - Predicted 04/30/2025 4.47     FEV1 - Predicted 04/30/2025 3.31     FVC - PRE 04/30/2025 3.22     FEV1 - Pre 04/30/2025 1.30     FVC - Post 04/30/2025 4.03     FEV1 - Post 04/30/2025 1.54      The ASCVD Risk score ([place] GARCIA, et al., 2019) failed to calculate for the following reasons:    Cannot find a previous HDL lab    Cannot find a previous total cholesterol lab    Physical Exam  CONSTITUTIONAL - well nourished, well developed, looks like stated age, in no acute distress, not ill-appearing, and not tired appearing  SKIN - normal skin color and pigmentation, normal skin turgor without rash, lesions, or nodules visualized  HEAD - no trauma, normocephalic  EYES - pupils are equal and reactive to light, extraocular muscles are intact, and normal external exam  NECK - supple without rigidity, no neck mass was observed, no thyromegaly or thyroid nodules  CHEST - diffuse, mild expiratory wheezing   CARDIAC - regular rate and regular rhythm, no skipped beats, no murmur  EXTREMITIES - no obvious or evident edema, no obvious or evident deformities  NEUROLOGICAL - normal gait, normal balance, normal motor, no ataxia; alert, oriented and no focal signs  PSYCHIATRIC - alert, pleasant and cordial, age-appropriate  IMMUNOLOGIC - no cervical lymphadenopathy    Assessment and Plan  Problem List Items Addressed This Visit       Hypertension    Your BP is not yet at goal with your current regimen. We are increasing the dosage of your Jerri to 10-40mg. Please begin measuring your BP at home and bring a log with you to your next appointment.         Relevant Medications    amlodipine-olmesartan (Jerri) 10-40 mg tablet    Chronic obstructive pulmonary disease (Multi) - Primary    PFT shows an obstructive pattern consistent with COPD. Pt was provided a sample of Trelegy in the office and was given a script to last him until he follows up with pulmonology in  July.          Relevant Medications    fluticasone-umeclidin-vilanter (Trelegy Ellipta) 100-62.5-25 mcg blister with device            [1] No family history on file.  [2]   Outpatient Medications Prior to Visit   Medication Sig Dispense Refill    albuterol 90 mcg/actuation inhaler Inhale 2 puffs every 4 hours if needed for wheezing. 18 g 11    nebivolol (Bystolic) 5 mg tablet Take 1 tablet (5 mg) by mouth once daily. 30 tablet 0    thiamine (Vitamin B-1) 250 mg tablet Take 4 tablets (1,000 mg) by mouth once daily.      amlodipine-olmesartan (Jerri) 5-40 mg tablet Take 1 tablet by mouth once daily. 30 tablet 0    amLODIPine (Norvasc) 10 mg tablet Take by mouth once daily.      losartan (Cozaar) 25 mg tablet Take by mouth once daily.       No facility-administered medications prior to visit.

## 2025-05-28 DIAGNOSIS — I10 HYPERTENSION, UNSPECIFIED TYPE: ICD-10-CM

## 2025-05-28 PROBLEM — J44.9 CHRONIC OBSTRUCTIVE PULMONARY DISEASE (MULTI): Status: ACTIVE | Noted: 2025-05-28

## 2025-05-28 RX ORDER — NEBIVOLOL 5 MG/1
5 TABLET ORAL DAILY
Qty: 30 TABLET | Refills: 0 | Status: SHIPPED | OUTPATIENT
Start: 2025-05-28

## 2025-05-28 NOTE — PROGRESS NOTES
I reviewed and examined the patient. I was present for the key exam elements, and I fully participated in the patient's care. I discussed the management of the care with the resident. I have personally reviewed the pertinent labs and imaging, as well as recent notes, with the patient. I have reviewed the note above and agree with the resident's medical decision making as documented in the resident's note, in addition to the following comments / findings:     Agree with the rest of the plan outlined below by resident physician. No red flags.      The patient understands and agrees to the assessment and plan of care. Patient has also agreed to follow up and comply with the treatment and evaluation as recommended today. Patient was instructed to call the office at [phone] should questions arise regarding their treatment or care.     Alfred Robin DO, FAOASM  Family Medicine   [address], [address]  [place] [address]     Alfred Robin DO

## 2025-05-28 NOTE — ASSESSMENT & PLAN NOTE
Your BP is not yet at goal with your current regimen. We are increasing the dosage of your Jerri to 10-40mg. Please begin measuring your BP at home and bring a log with you to your next appointment.

## 2025-05-28 NOTE — ASSESSMENT & PLAN NOTE
PFT shows an obstructive pattern consistent with COPD. Pt was provided a sample of Trelegy in the office and was given a script to last him until he follows up with pulmonology in July.

## 2025-06-30 DIAGNOSIS — I10 PRIMARY HYPERTENSION: ICD-10-CM

## 2025-06-30 DIAGNOSIS — I10 HYPERTENSION, UNSPECIFIED TYPE: ICD-10-CM

## 2025-06-30 NOTE — TELEPHONE ENCOUNTER
Patient needs refills on Nebivolol 5mg takes one a day #90  Amlodipine/Olmesartan 10/40 mg takes one a day #90  Ph[facility]acy [facility] [place] [facility]

## 2025-07-01 ENCOUNTER — APPOINTMENT (OUTPATIENT)
Dept: PULMONOLOGY | Facility: CLINIC | Age: 75
End: 2025-07-01
Payer: MEDICARE

## 2025-07-01 VITALS
BODY MASS INDEX: 28.89 KG/M2 | DIASTOLIC BLOOD PRESSURE: 81 MMHG | WEIGHT: 219 LBS | SYSTOLIC BLOOD PRESSURE: 155 MMHG | HEART RATE: 75 BPM | OXYGEN SATURATION: 94 %

## 2025-07-01 DIAGNOSIS — J44.9 CHRONIC OBSTRUCTIVE PULMONARY DISEASE, UNSPECIFIED COPD TYPE (MULTI): Primary | ICD-10-CM

## 2025-07-01 PROCEDURE — 3077F SYST BP >= 140 MM HG: CPT | Performed by: PEDIATRICS

## 2025-07-01 PROCEDURE — 99215 OFFICE O/P EST HI 40 MIN: CPT | Performed by: PEDIATRICS

## 2025-07-01 PROCEDURE — 1159F MED LIST DOCD IN RCRD: CPT | Performed by: PEDIATRICS

## 2025-07-01 PROCEDURE — 1036F TOBACCO NON-USER: CPT | Performed by: PEDIATRICS

## 2025-07-01 PROCEDURE — 3079F DIAST BP 80-89 MM HG: CPT | Performed by: PEDIATRICS

## 2025-07-01 PROCEDURE — 1160F RVW MEDS BY RX/DR IN RCRD: CPT | Performed by: PEDIATRICS

## 2025-07-01 RX ORDER — AMLODIPINE BESYLATE AND OLMESARTAN MEDOXOMIL 10; 40 MG/1; MG/1
1 TABLET ORAL DAILY
Qty: 90 TABLET | Refills: 1 | Status: SHIPPED | OUTPATIENT
Start: 2025-07-01 | End: 2025-12-28

## 2025-07-01 RX ORDER — NEBIVOLOL 5 MG/1
5 TABLET ORAL DAILY
Qty: 90 TABLET | Refills: 1 | Status: SHIPPED | OUTPATIENT
Start: 2025-07-01 | End: 2025-12-28

## 2025-07-01 NOTE — PROGRESS NOTES
"Subjective   Patient ID: Yevgeniy Anders \"Poncho\" is a 74 y.o. male who presents for emphysema, SOB    HPI    7/1/2025:  Mr Anders is doing better, he feels the trelegy is working very well for him.  He rarely needs albuterol.  He did a PFT that shows severe obstruction.    Initial visit 4/23/2025:  Mr Anders is a 74yr old that has shortness of breath over the last 2 years, it has been about the same over that time frame.  It seems somewhat random in that is does not always happen.  It does not happen at rest but at times does just from talking. He was given albuterol which helped but he is out of that.  Previously he has had no pulmonary issues.  He saw his PCP in December who suggested cardiac evaluation, this did not happen.     I reviewed an angio CT from 3/29/25 that shows moderate emphysema     He is a former smoker from age [age]    Review of Systems    See scanned documents attached to this note for review of systems, and appropriate scales/scores for this visit.     Objective   Physical Exam  Constitutional:       Appearance: Normal appearance.   HENT:      Head: Normocephalic and atraumatic.      Mouth/Throat:      Pharynx: Oropharynx is clear.   Cardiovascular:      Rate and Rhythm: Normal rate and regular rhythm.      Pulses: Normal pulses.      Heart sounds: Normal heart sounds.   Pulmonary:      Effort: Pulmonary effort is normal.      Breath sounds: Normal breath sounds. No wheezing, rhonchi or rales.   Abdominal:      General: Bowel sounds are normal.      Palpations: Abdomen is soft.   Musculoskeletal:         General: Normal range of motion.   Skin:     General: Skin is warm and dry.   Neurological:      General: No focal deficit present.      Mental Status: He is alert and oriented to person, place, and time.   Psychiatric:         Mood and Affect: Mood normal.       Assessment/Plan     74yr old with shortness of breath      SOB: emphysema on CT chest  - will get PFT and 6MWT  - renewed albuterol  - " encouraged cardiac evaluation as well.  Gave contact information for Dr Adame's office  7/1/2025:  continue trelegy and albuterol as needed       Follow up 6 months       Ahsan Ross MD 07/01/25 9:22 AM

## 2025-12-12 ENCOUNTER — APPOINTMENT (OUTPATIENT)
Dept: PRIMARY CARE | Facility: CLINIC | Age: 75
End: 2025-12-12
Payer: MEDICARE

## 2026-01-02 ENCOUNTER — APPOINTMENT (OUTPATIENT)
Dept: PULMONOLOGY | Facility: CLINIC | Age: 76
End: 2026-01-02
Payer: MEDICARE